# Patient Record
Sex: FEMALE | Race: WHITE | NOT HISPANIC OR LATINO | Employment: PART TIME | URBAN - METROPOLITAN AREA
[De-identification: names, ages, dates, MRNs, and addresses within clinical notes are randomized per-mention and may not be internally consistent; named-entity substitution may affect disease eponyms.]

---

## 2017-01-19 ENCOUNTER — TELEPHONE (OUTPATIENT)
Dept: OBSTETRICS AND GYNECOLOGY | Facility: CLINIC | Age: 57
End: 2017-01-19

## 2017-01-19 RX ORDER — ESTRADIOL AND NORETHINDRONE ACETATE .5; .1 MG/1; MG/1
1 TABLET ORAL DAILY
Qty: 90 TABLET | Refills: 0 | Status: SHIPPED | OUTPATIENT
Start: 2017-01-19 | End: 2017-03-15

## 2017-01-19 NOTE — TELEPHONE ENCOUNTER
----- Message from Nelli Estrella sent at 1/18/2017 12:30 PM EST -----  Regarding: REFILL REQUEST  Contact: [phone]  DR MARTINO PT NEEDS REFILL ON HORMONE, ANNUAL IS 03/15/17    [facility] [place]       Patient requesting refill on Mimvey 0.5/1.0mg. She has an appointment on 3/15/2017 with Dr. Martino. E-Rx sent to [facility] in [place], [place]

## 2017-03-15 ENCOUNTER — OFFICE VISIT (OUTPATIENT)
Dept: OBSTETRICS AND GYNECOLOGY | Facility: CLINIC | Age: 57
End: 2017-03-15

## 2017-03-15 VITALS
WEIGHT: 126 LBS | DIASTOLIC BLOOD PRESSURE: 70 MMHG | SYSTOLIC BLOOD PRESSURE: 118 MMHG | HEIGHT: 60 IN | BODY MASS INDEX: 24.74 KG/M2

## 2017-03-15 DIAGNOSIS — Z01.419 WOMEN'S ANNUAL ROUTINE GYNECOLOGICAL EXAMINATION: Primary | ICD-10-CM

## 2017-03-15 PROBLEM — Z98.890 S/P ENDOMETRIAL ABLATION: Status: ACTIVE | Noted: 2017-03-15

## 2017-03-15 PROCEDURE — 99396 PREV VISIT EST AGE 40-64: CPT | Performed by: OBSTETRICS & GYNECOLOGY

## 2017-03-15 PROCEDURE — 82272 OCCULT BLD FECES 1-3 TESTS: CPT | Performed by: OBSTETRICS & GYNECOLOGY

## 2017-03-15 RX ORDER — NADOLOL 20 MG/1
20 TABLET ORAL DAILY
Refills: 5 | COMMUNITY
Start: 2017-02-17 | End: 2018-05-31 | Stop reason: SDUPTHER

## 2017-03-15 RX ORDER — CYCLOPENTOLATE HYDROCHLORIDE 10 MG/ML
1 SOLUTION/ DROPS OPHTHALMIC 2 TIMES DAILY
Refills: 1 | COMMUNITY
Start: 2017-03-01 | End: 2018-05-31 | Stop reason: SDUPTHER

## 2017-03-15 RX ORDER — FOSINOPRIL SODIUM 40 MG/1
40 TABLET ORAL 2 TIMES DAILY
Refills: 5 | COMMUNITY
Start: 2017-02-17 | End: 2018-05-31 | Stop reason: SDUPTHER

## 2017-03-15 RX ORDER — AMLODIPINE BESYLATE 5 MG/1
5 TABLET ORAL DAILY
Refills: 4 | COMMUNITY
Start: 2017-03-03 | End: 2018-05-31 | Stop reason: SDUPTHER

## 2017-03-15 RX ORDER — CLONIDINE HYDROCHLORIDE 0.3 MG/1
0.3 TABLET ORAL 2 TIMES DAILY
Refills: 5 | COMMUNITY
Start: 2017-02-27 | End: 2018-05-31 | Stop reason: SDUPTHER

## 2017-03-15 RX ORDER — PRAVASTATIN SODIUM 40 MG
40 TABLET ORAL DAILY
COMMUNITY
Start: 2017-03-12 | End: 2018-05-31 | Stop reason: SDUPTHER

## 2017-03-15 RX ORDER — CONJUGATED ESTROGENS/BAZEDOXIFENE .45; 2 MG/1; MG/1
1 TABLET, FILM COATED ORAL DAILY
Qty: 30 TABLET | Refills: 11 | Status: SHIPPED | OUTPATIENT
Start: 2017-03-15 | End: 2017-03-23

## 2017-03-15 RX ORDER — CLONAZEPAM 0.5 MG/1
0.5 TABLET ORAL 2 TIMES DAILY
Refills: 2 | COMMUNITY
Start: 2017-02-14 | End: 2018-05-31 | Stop reason: SDUPTHER

## 2017-03-15 RX ORDER — POTASSIUM CHLORIDE 750 MG/1
10 CAPSULE, EXTENDED RELEASE ORAL DAILY
COMMUNITY
Start: 2017-03-13 | End: 2018-05-31 | Stop reason: SDUPTHER

## 2017-03-15 RX ORDER — ERYTHROMYCIN 5 MG/G
1 OINTMENT OPHTHALMIC 3 TIMES DAILY
Refills: 1 | COMMUNITY
Start: 2017-03-01 | End: 2018-05-31 | Stop reason: SDUPTHER

## 2017-03-15 RX ORDER — METHOCARBAMOL 750 MG/1
750 TABLET, FILM COATED ORAL AS NEEDED
Refills: 5 | COMMUNITY
Start: 2017-02-14 | End: 2018-05-31 | Stop reason: SDUPTHER

## 2017-03-15 RX ORDER — CITALOPRAM 40 MG/1
40 TABLET ORAL DAILY
Refills: 5 | COMMUNITY
Start: 2017-03-03 | End: 2018-05-31 | Stop reason: SDUPTHER

## 2017-03-15 RX ORDER — DIPHENOXYLATE HYDROCHLORIDE AND ATROPINE SULFATE 2.5; .025 MG/1; MG/1
2 TABLET ORAL 2 TIMES DAILY
Refills: 2 | COMMUNITY
Start: 2017-02-14 | End: 2018-05-31 | Stop reason: SDUPTHER

## 2017-03-15 NOTE — PROGRESS NOTES
"Subjective   Chief Complaint   Patient presents with   • Annual Exam     Karli Jeffery is a 56 y.o. year old  menopausal female presenting to be seen for her annual exam.  There has not been vaginal bleeding in the last 12 months.  Hot flashes and night sweats are not a significant problem.    SEXUAL Hx:  She is sexually active.  Vaginal dryness is not a problem.    HEALTH Hx:  She exercises regularly: yes. Mostly working at [employer]  She wears her seat belt:yes.  She has concerns about domestic violence: no.  She has noticed changes in height: no.              Calcium intake is not adequate -she doesn't like dairy - occas Tums    The following portions of the patient's history were reviewed and updated as appropriate:problem list, current medications, allergies, past family history, past medical history, past social history and past surgical history.    Smoking status: Current Every Day Smoker                                                   Packs/day: 0.50      Years: 0.00         Types: Cigarettes     Smokeless status: Not on file                       Review of Systems normal bladder except SCOTTIE - Kegels help -not as bad as it was,; bowels normal     Objective   Visit Vitals   • /70   • Ht 60.25\" (153 cm)   • Wt 126 lb (57.2 kg)   • BMI 24.4 kg/m2       General:  well developed; well nourished  no acute distress   Skin:  No suspicious lesions seen   Thyroid: not examined   Breasts:  Examined in supine position  Symmetric without masses or skin dimpling  Nipples normal without inversion, lesions or discharge  There are no palpable axillary nodes   Abdomen: soft, non-tender; no masses  no umbilical or inginual hernias are present  no hepato-splenomegaly   Pelvis: Clinical staff was present for exam  External genitalia:  normal appearance of the external genitalia including Bartholin's and [place]'s glands.  :  urethral meatus normal; urethral hypermobility is absent.  Uterus:  normal " size, shape and consistency.  Adnexa:  non palpable bilaterally.  Rectal:  anus visually normal appearing. recto-vaginal exam unremarkable and confirms findings; guaiac negative;        Assessment   1. Normal postmenopausal female  2. Status post anal sphincter repair by Dr. Wheat [facility] 1988; no colonoscopy since     Plan   1. Consider colonoscopy  2. We'll switch to Duavee due to cost hopefully this will not be $100 a month.  If so consider generic Estrace and Provera.    New Medications Ordered This Visit   Medications   • PROAIR  (90 BASE) MCG/ACT inhaler     Sig: Take 2 puffs by mouth 2 (Two) Times a Day.     Refill:  5   • amLODIPine (NORVASC) 5 MG tablet     Sig: Take 5 mg by mouth Daily.     Refill:  4   • citalopram (CeleXA) 40 MG tablet     Sig: Take 40 mg by mouth Daily.     Refill:  5   • clonazePAM (KlonoPIN) 0.5 MG tablet     Sig: Take 0.5 mg by mouth 2 (Two) Times a Day.     Refill:  2   • CloNIDine (CATAPRES) 0.3 MG tablet     Sig: Take 0.3 mg by mouth 2 (Two) Times a Day.     Refill:  5   • cyclopentolate (CYCLOGYL) 1 % ophthalmic solution     Sig: Administer 1 drop to both eyes 2 (Two) Times a Day.     Refill:  1   • diphenoxylate-atropine (LOMOTIL) 2.5-0.025 MG per tablet     Sig: Take 2 tablets by mouth 2 (Two) Times a Day.     Refill:  2   • erythromycin (ROMYCIN) 5 MG/GM ophthalmic ointment     Sig: Administer 1 application to both eyes 3 (Three) Times a Day.     Refill:  1   • fosinopril (MONOPRIL) 40 MG tablet     Sig: Take 40 mg by mouth 2 (Two) Times a Day.     Refill:  5   • methocarbamol (ROBAXIN) 750 MG tablet     Sig: Take 750 mg by mouth As Needed.     Refill:  5   • nadolol (CORGARD) 20 MG tablet     Sig: Take 20 mg by mouth Daily. 3 tabs daily     Refill:  5   • potassium chloride (MICRO-K) 10 MEQ CR capsule     Sig: Take 10 mEq by mouth Daily.   • pravastatin (PRAVACHOL) 40 MG tablet     Sig: Take 40 mg by mouth Daily.   • DUAVEE 0.45-20 MG tablet     Sig: Take 1 tablet by  mouth Daily.     Dispense:  30 tablet     Refill:  11          This note was electronically signed.    Sandip Martino M.D.  March 15, 2017

## 2017-03-23 ENCOUNTER — TELEPHONE (OUTPATIENT)
Dept: OBSTETRICS AND GYNECOLOGY | Facility: CLINIC | Age: 57
End: 2017-03-23

## 2017-03-23 RX ORDER — ESTRADIOL 0.5 MG/1
0.5 TABLET ORAL DAILY
Qty: 30 TABLET | Refills: 11 | Status: SHIPPED | OUTPATIENT
Start: 2017-03-23 | End: 2018-02-12 | Stop reason: SDUPTHER

## 2017-03-23 RX ORDER — MEDROXYPROGESTERONE ACETATE 2.5 MG/1
2.5 TABLET ORAL DAILY
Qty: 30 TABLET | Refills: 5 | Status: SHIPPED | OUTPATIENT
Start: 2017-03-23 | End: 2018-01-31 | Stop reason: SDUPTHER

## 2017-03-23 NOTE — TELEPHONE ENCOUNTER
----- Message from Nelli Estrella sent at 3/23/2017 12:21 PM EDT -----  Gave sample of duavee on 3/15,  But checked with pharmacy and this will cost her $150. Has to got to work today 130 - 6 Cell #  [phone]    Ok to leave msg.

## 2017-03-23 NOTE — TELEPHONE ENCOUNTER
I have a prescribed Estrace or 0.5 mg daily ; in addition she needs to take Provera 2.5 mg at night for 15 days each month.  If she has  spotting after finishing the Provera, let us know and we will put her on it nightly

## 2017-05-08 ENCOUNTER — TELEPHONE (OUTPATIENT)
Dept: OBSTETRICS AND GYNECOLOGY | Facility: CLINIC | Age: 57
End: 2017-05-08

## 2017-05-08 RX ORDER — FLUCONAZOLE 150 MG/1
150 TABLET ORAL DAILY
Qty: 1 TABLET | Refills: 0 | Status: SHIPPED | OUTPATIENT
Start: 2017-05-08 | End: 2018-05-31 | Stop reason: SDUPTHER

## 2017-06-07 ENCOUNTER — TRANSCRIBE ORDERS (OUTPATIENT)
Dept: OBSTETRICS AND GYNECOLOGY | Facility: CLINIC | Age: 57
End: 2017-06-07

## 2017-06-07 DIAGNOSIS — Z12.31 VISIT FOR SCREENING MAMMOGRAM: Primary | ICD-10-CM

## 2017-06-14 ENCOUNTER — HOSPITAL ENCOUNTER (OUTPATIENT)
Dept: MAMMOGRAPHY | Facility: HOSPITAL | Age: 57
Discharge: HOME | End: 2017-06-14
Attending: OBSTETRICS & GYNECOLOGY | Admitting: OBSTETRICS & GYNECOLOGY

## 2017-06-14 DIAGNOSIS — Z12.31 VISIT FOR SCREENING MAMMOGRAM: ICD-10-CM

## 2017-06-14 PROCEDURE — 77067 SCR MAMMO BI INCL CAD: CPT | Performed by: RADIOLOGY

## 2017-06-14 PROCEDURE — 77063 BREAST TOMOSYNTHESIS BI: CPT

## 2017-06-14 PROCEDURE — G0202 SCR MAMMO BI INCL CAD: HCPCS

## 2017-06-14 PROCEDURE — 77063 BREAST TOMOSYNTHESIS BI: CPT | Performed by: RADIOLOGY

## 2018-01-31 RX ORDER — MEDROXYPROGESTERONE ACETATE 2.5 MG/1
TABLET ORAL
Qty: 30 TABLET | Refills: 2 | Status: SHIPPED | OUTPATIENT
Start: 2018-01-31 | End: 2018-05-31 | Stop reason: SDUPTHER

## 2018-02-12 RX ORDER — ESTRADIOL 0.5 MG/1
TABLET ORAL
Qty: 30 TABLET | Refills: 2 | Status: SHIPPED | OUTPATIENT
Start: 2018-02-12 | End: 2018-05-03 | Stop reason: SDUPTHER

## 2018-05-03 ENCOUNTER — TELEPHONE (OUTPATIENT)
Dept: OBSTETRICS AND GYNECOLOGY | Facility: CLINIC | Age: 58
End: 2018-05-03

## 2018-05-03 RX ORDER — ESTRADIOL 0.5 MG/1
0.5 TABLET ORAL DAILY
Qty: 30 TABLET | Refills: 0 | Status: SHIPPED | OUTPATIENT
Start: 2018-05-03 | End: 2018-05-31 | Stop reason: SDUPTHER

## 2018-05-03 NOTE — TELEPHONE ENCOUNTER
Dr. Martino patient called requesting a refill on Estradiol 0.5 mg. Patient has an appointment with Dr. Martino on 5/31/2018. I will send in Estradiol 0.5mg #30 no refills.

## 2018-05-03 NOTE — TELEPHONE ENCOUNTER
Salena pt called requesting refills on Estradiol be called to [facility] in [place]. Please call to pharmacy

## 2018-05-31 ENCOUNTER — OFFICE VISIT (OUTPATIENT)
Dept: OBSTETRICS AND GYNECOLOGY | Facility: CLINIC | Age: 58
End: 2018-05-31

## 2018-05-31 VITALS
WEIGHT: 118 LBS | DIASTOLIC BLOOD PRESSURE: 70 MMHG | HEIGHT: 61 IN | SYSTOLIC BLOOD PRESSURE: 120 MMHG | BODY MASS INDEX: 22.28 KG/M2

## 2018-05-31 DIAGNOSIS — Z12.39 ENCOUNTER FOR OTHER SCREENING FOR MALIGNANT NEOPLASM OF BREAST: ICD-10-CM

## 2018-05-31 DIAGNOSIS — Z01.419 ENCOUNTER FOR WELL WOMAN EXAM WITH ROUTINE GYNECOLOGICAL EXAM: Primary | ICD-10-CM

## 2018-05-31 PROCEDURE — 99396 PREV VISIT EST AGE 40-64: CPT | Performed by: OBSTETRICS & GYNECOLOGY

## 2018-05-31 PROCEDURE — 82272 OCCULT BLD FECES 1-3 TESTS: CPT | Performed by: OBSTETRICS & GYNECOLOGY

## 2018-05-31 RX ORDER — POTASSIUM CHLORIDE 750 MG/1
CAPSULE, EXTENDED RELEASE ORAL
COMMUNITY

## 2018-05-31 RX ORDER — FOSINOPRIL SODIUM 40 MG/1
TABLET ORAL
COMMUNITY

## 2018-05-31 RX ORDER — CLONAZEPAM 0.5 MG/1
TABLET ORAL EVERY 8 HOURS SCHEDULED
COMMUNITY

## 2018-05-31 RX ORDER — PRAVASTATIN SODIUM 40 MG
TABLET ORAL
COMMUNITY

## 2018-05-31 RX ORDER — DIPHENOXYLATE HCL/ATROPINE 2.5-.025/5
5 LIQUID (ML) ORAL 4 TIMES DAILY PRN
COMMUNITY
End: 2021-08-23

## 2018-05-31 RX ORDER — MEDROXYPROGESTERONE ACETATE 2.5 MG/1
2.5 TABLET ORAL DAILY
Qty: 30 TABLET | Refills: 5 | Status: SHIPPED | OUTPATIENT
Start: 2018-05-31 | End: 2018-12-03 | Stop reason: SDUPTHER

## 2018-05-31 RX ORDER — AMLODIPINE BESYLATE 5 MG/1
TABLET ORAL
COMMUNITY

## 2018-05-31 RX ORDER — CITALOPRAM 40 MG/1
TABLET ORAL
COMMUNITY

## 2018-05-31 RX ORDER — ALBUTEROL SULFATE 90 UG/1
AEROSOL, METERED RESPIRATORY (INHALATION) EVERY 4 HOURS
COMMUNITY

## 2018-05-31 RX ORDER — METHOCARBAMOL 750 MG/1
TABLET, FILM COATED ORAL
COMMUNITY

## 2018-05-31 RX ORDER — ESTRADIOL 0.5 MG/1
TABLET ORAL
COMMUNITY
End: 2018-05-31 | Stop reason: SDUPTHER

## 2018-05-31 RX ORDER — NADOLOL 20 MG/1
TABLET ORAL
COMMUNITY

## 2018-05-31 RX ORDER — MEDROXYPROGESTERONE ACETATE 5 MG/1
0.5 TABLET ORAL DAILY
COMMUNITY
Start: 2018-05-31 | End: 2018-05-31 | Stop reason: ALTCHOICE

## 2018-05-31 RX ORDER — ESTRADIOL 0.5 MG/1
0.5 TABLET ORAL DAILY
Qty: 30 TABLET | Refills: 11 | Status: SHIPPED | OUTPATIENT
Start: 2018-05-31 | End: 2019-05-22 | Stop reason: SDUPTHER

## 2018-05-31 RX ORDER — CLONIDINE HYDROCHLORIDE 0.3 MG/1
TABLET ORAL
COMMUNITY

## 2018-05-31 NOTE — PROGRESS NOTES
"Subjective   Chief Complaint   Patient presents with   • Annual Exam     Karli Jeffery is a 57 y.o. year old  menopausal female presenting to be seen for her annual exam.  There has not been vaginal bleeding in the last 12 months.  Hot flashes and night sweats are not a significant problem.    SEXUAL Hx:  She is sexually active.  Vaginal dryness is a problem.    HEALTH Hx:  She exercises regularly: no. Working   She wears her seat belt:yes.  She has concerns about domestic violence: no.  She has noticed changes in height: no.              Calcium intake is not adequate    The following portions of the patient's history were reviewed and updated as appropriate:problem list, current medications, allergies, past family history, past medical history, past social history and past surgical history.    Smoking status: Current Every Day Smoker                                                   Packs/day: 0.50      Years: 0.00         Types: Cigarettes  Smokeless tobacco: Never Used                        Review of Systems   Her bowels and bladder are normal; bad stomach virus - didn't eat for a long time     Objective   /70   Ht 153.7 cm (60.5\")   Wt 53.5 kg (118 lb)   BMI 22.67 kg/m²      General:  well developed; well nourished  no acute distress  appears stated age   Skin:  No suspicious lesions seen   Thyroid: not examined   Breasts:  Examined in supine position  Symmetric without masses or skin dimpling  Nipples normal without inversion, lesions or discharge  There are no palpable axillary nodes   Abdomen: soft, non-tender; no masses  no umbilical or inginual hernias are present  no hepato-splenomegaly   Pelvis: Clinical staff was present for exam  External genitalia:  normal appearance of the external genitalia including Bartholin's and [place]'s glands.  :  urethral meatus normal; urethra hypermobile;  Vaginal:  atrophic mucosal changes are present;  Cervix:  normal appearance. pap doen  Uterus:  " normal size, shape and consistency.  Adnexa:  non palpable bilaterally.  Rectal:  anus visually normal appearing. recto-vaginal exam unremarkable and confirms findings; no masses; guaiac negative;        Assessment   1. Normal GYN examination doing well on Estrace and Provera for 15 days a month.  She's had an ablation in the past.  2. Pap co-testing up-to-date.  3. She got a letter due for mammogram order placed.  4. Discussed colonoscopy.  She is status post repair in 1988 at  from a bad episiotomy 1981     Plan   1. Calcium discussed  1200 mg daily in divided doses ideally in diet  2. Regular weight bearing exercise  3. Breast self awareness, mammograms discussed  4. Routine colonoscopies.    New Medications Ordered This Visit   Medications   • estradiol (ESTRACE) 0.5 MG tablet     Sig: Take 1 tablet by mouth Daily.     Dispense:  30 tablet     Refill:  11   • medroxyPROGESTERone (PROVERA) 2.5 MG tablet     Sig: Take 1 tablet by mouth Daily.     Dispense:  30 tablet     Refill:  5           This note was electronically signed.    Sandip Martino M.D.  May 31, 2018

## 2018-06-20 ENCOUNTER — HOSPITAL ENCOUNTER (OUTPATIENT)
Dept: MAMMOGRAPHY | Facility: HOSPITAL | Age: 58
Discharge: HOME | End: 2018-06-20
Attending: OBSTETRICS & GYNECOLOGY | Admitting: OBSTETRICS & GYNECOLOGY

## 2018-06-20 DIAGNOSIS — Z12.39 ENCOUNTER FOR OTHER SCREENING FOR MALIGNANT NEOPLASM OF BREAST: ICD-10-CM

## 2018-06-20 PROCEDURE — 77063 BREAST TOMOSYNTHESIS BI: CPT | Performed by: RADIOLOGY

## 2018-06-20 PROCEDURE — 77067 SCR MAMMO BI INCL CAD: CPT

## 2018-06-20 PROCEDURE — 77063 BREAST TOMOSYNTHESIS BI: CPT

## 2018-06-20 PROCEDURE — 77067 SCR MAMMO BI INCL CAD: CPT | Performed by: RADIOLOGY

## 2018-10-31 ENCOUNTER — TRANSCRIBE ORDERS (OUTPATIENT)
Dept: ADMINISTRATIVE | Facility: HOSPITAL | Age: 58
End: 2018-10-31

## 2018-10-31 DIAGNOSIS — N63.15 BREAST LUMP ON RIGHT SIDE AT 3 O'CLOCK POSITION: Primary | ICD-10-CM

## 2018-11-14 ENCOUNTER — HOSPITAL ENCOUNTER (OUTPATIENT)
Dept: MAMMOGRAPHY | Facility: HOSPITAL | Age: 58
Discharge: HOME | End: 2018-11-14
Admitting: PHYSICIAN ASSISTANT

## 2018-11-14 ENCOUNTER — HOSPITAL ENCOUNTER (OUTPATIENT)
Dept: ULTRASOUND IMAGING | Facility: HOSPITAL | Age: 58
Discharge: HOME | End: 2018-11-14

## 2018-11-14 DIAGNOSIS — N63.15 BREAST LUMP ON RIGHT SIDE AT 3 O'CLOCK POSITION: ICD-10-CM

## 2018-11-14 PROCEDURE — 76642 ULTRASOUND BREAST LIMITED: CPT

## 2018-11-14 PROCEDURE — G0279 TOMOSYNTHESIS, MAMMO: HCPCS

## 2018-11-14 PROCEDURE — 77065 DX MAMMO INCL CAD UNI: CPT | Performed by: RADIOLOGY

## 2018-11-14 PROCEDURE — 77065 DX MAMMO INCL CAD UNI: CPT

## 2018-11-14 PROCEDURE — 76642 ULTRASOUND BREAST LIMITED: CPT | Performed by: RADIOLOGY

## 2018-11-14 PROCEDURE — 77061 BREAST TOMOSYNTHESIS UNI: CPT | Performed by: RADIOLOGY

## 2018-12-03 RX ORDER — MEDROXYPROGESTERONE ACETATE 2.5 MG/1
TABLET ORAL
Qty: 30 TABLET | Refills: 5 | Status: SHIPPED | OUTPATIENT
Start: 2018-12-03 | End: 2019-05-01 | Stop reason: SDUPTHER

## 2019-05-01 RX ORDER — MEDROXYPROGESTERONE ACETATE 2.5 MG/1
TABLET ORAL
Qty: 30 TABLET | Refills: 1 | Status: SHIPPED | OUTPATIENT
Start: 2019-05-01 | End: 2019-05-31 | Stop reason: SDUPTHER

## 2019-05-22 ENCOUNTER — TELEPHONE (OUTPATIENT)
Dept: OBSTETRICS AND GYNECOLOGY | Facility: CLINIC | Age: 59
End: 2019-05-22

## 2019-05-22 RX ORDER — ESTRADIOL 0.5 MG/1
0.5 TABLET ORAL DAILY
Qty: 30 TABLET | Refills: 0 | Status: SHIPPED | OUTPATIENT
Start: 2019-05-22 | End: 2019-06-06 | Stop reason: SDUPTHER

## 2019-05-22 RX ORDER — ALBUTEROL SULFATE 90 UG/1
AEROSOL, METERED RESPIRATORY (INHALATION) EVERY 4 HOURS
COMMUNITY
End: 2019-06-27

## 2019-05-31 RX ORDER — MEDROXYPROGESTERONE ACETATE 2.5 MG/1
TABLET ORAL
Qty: 30 TABLET | Refills: 0 | Status: SHIPPED | OUTPATIENT
Start: 2019-05-31 | End: 2019-06-27 | Stop reason: SDUPTHER

## 2019-06-06 ENCOUNTER — TELEPHONE (OUTPATIENT)
Dept: OBSTETRICS AND GYNECOLOGY | Facility: CLINIC | Age: 59
End: 2019-06-06

## 2019-06-06 RX ORDER — ESTRADIOL 0.5 MG/1
0.5 TABLET ORAL DAILY
Qty: 90 TABLET | Refills: 0 | Status: SHIPPED | OUTPATIENT
Start: 2019-06-06 | End: 2019-06-27 | Stop reason: SDUPTHER

## 2019-06-27 ENCOUNTER — OFFICE VISIT (OUTPATIENT)
Dept: OBSTETRICS AND GYNECOLOGY | Facility: CLINIC | Age: 59
End: 2019-06-27

## 2019-06-27 VITALS
WEIGHT: 126 LBS | SYSTOLIC BLOOD PRESSURE: 110 MMHG | DIASTOLIC BLOOD PRESSURE: 70 MMHG | BODY MASS INDEX: 23.79 KG/M2 | HEIGHT: 61 IN

## 2019-06-27 DIAGNOSIS — Z01.411 ENCOUNTER FOR GYNECOLOGICAL EXAMINATION WITH ABNORMAL FINDING: Primary | ICD-10-CM

## 2019-06-27 PROCEDURE — 99396 PREV VISIT EST AGE 40-64: CPT | Performed by: OBSTETRICS & GYNECOLOGY

## 2019-06-27 RX ORDER — MEDROXYPROGESTERONE ACETATE 2.5 MG/1
2.5 TABLET ORAL DAILY
Qty: 90 TABLET | Refills: 3 | Status: SHIPPED | OUTPATIENT
Start: 2019-06-27 | End: 2020-06-16

## 2019-06-27 RX ORDER — ESTRADIOL 0.5 MG/1
0.5 TABLET ORAL DAILY
Qty: 90 TABLET | Refills: 3 | Status: SHIPPED | OUTPATIENT
Start: 2019-06-27 | End: 2020-06-16

## 2019-06-27 RX ORDER — CALCIUM CARBONATE 750 MG/1
750 TABLET, CHEWABLE ORAL DAILY
COMMUNITY

## 2019-06-27 NOTE — PROGRESS NOTES
Subjective   Chief Complaint   Patient presents with   • Annual Exam     Karli Jeffery is a 58 y.o. year old  menopausal female presenting to be seen for her annual exam.  There has not been vaginal bleeding in the last 12 months.  Hot flashes and night sweats are not a significant problem. She gets warm/hot at night around  of the month ?     SEXUAL Hx:  She is sexually active.  Vaginal dryness is a problem.  [place] is painful:no  She has concerns about domestic violence: no    HEALTH Hx:  She exercises regularly: yes. Works at [employer] a lot  She wears her seat belt:yes.  Self breast awareness: yes  She has noticed changes in height: no              Calcium intake is adequate 750 mg Tums daily              Caffeine intake: caffeine use is moderate-to-high daily; 4-20 ounce diet anamaria per day    The following portions of the patient's history were reviewed and updated as appropriate:problem list, current medications, allergies, past family history, past medical history, past social history and past surgical history.    Current Outpatient Medications:   •  albuterol (PROAIR HFA) 108 (90 Base) MCG/ACT inhaler, Every 4 (Four) Hours., Disp: , Rfl:   •  amLODIPine (NORVASC) 5 MG tablet, amlodipine 5 mg tablet  TAKE 1 TABLET BY MOUTH DAILY, Disp: , Rfl:   •  calcium carbonate EX (TUMS EX) 750 MG chewable tablet, Chew 750 mg Daily., Disp: , Rfl:   •  citalopram (CeleXA) 40 MG tablet, citalopram 40 mg tablet  TAKE 1 TABLET BY MOUTH DAILY AT BEDTIME, Disp: , Rfl:   •  clonazePAM (KLONOPIN) 0.5 MG tablet, Every 8 (Eight) Hours., Disp: , Rfl:   •  CloNIDine (CATAPRES) 0.3 MG tablet, clonidine HCl 0.3 mg tablet  TAKE 1 TABLET BY MOUTH TWICE DAILY, Disp: , Rfl:   •  diphenoxylate-atropine (LOMOTIL) 2.5-0.025 MG/5ML, Take 5 mL by mouth 4 (Four) Times a Day As Needed for Diarrhea., Disp: , Rfl:   •  estradiol (ESTRACE) 0.5 MG tablet, Take 1 tablet by mouth Daily., Disp: 90 tablet, Rfl: 3  •  fosinopril  "(MONOPRIL) 40 MG tablet, fosinopril 40 mg tablet  TAKE ONE TABLET BY MOUTH TWO TIMES A DAY, Disp: , Rfl:   •  medroxyPROGESTERone (PROVERA) 2.5 MG tablet, Take 1 tablet by mouth Daily., Disp: 90 tablet, Rfl: 3  •  methocarbamol (ROBAXIN) 750 MG tablet, methocarbamol 750 mg tablet  TAKE 1 TABLET BY MOUTH 3 TIMES A DAY AS NEEDED, Disp: , Rfl:   •  nadolol (CORGARD) 20 MG tablet, nadolol 20 mg tablet  TAKE 1 TABLET BY MOUTH EVERY MORNING TAKE 2 TABLETS BY MOUTH EVERY EVENING, Disp: , Rfl:   •  potassium chloride (MICRO-K) 10 MEQ CR capsule, potassium chloride ER 10 mEq capsule,extended release  TAKE 2 CAPSULES BY MOUTH AT BEDTIME, Disp: , Rfl:   •  pravastatin (PRAVACHOL) 40 MG tablet, pravastatin 40 mg tablet  TAKE 1 TABLET BY MOUTH AT BEDTIME, Disp: , Rfl:     Social History    Tobacco Use      Smoking status: Current Every Day Smoker        Packs/day: 0.33        Types: Cigarettes      Smokeless tobacco: Never Used    Social History     Substance and Sexual Activity   Alcohol Use Yes   • Alcohol/week: 1.8 oz   • Types: 3 Standard drinks or equivalent per week   • Frequency: 2-3 times a week         Review of Systems   Her bowels and bladder are normal; some SCOTTIE - no interest in surgery ; lavinia;s help     Objective   /70   Ht 155.6 cm (61.25\")   Wt 57.2 kg (126 lb)   Breastfeeding? No   BMI 23.61 kg/m²      General:  well developed; well nourished  no acute distress  appears stated age   Skin:  No suspicious lesions seen   Thyroid: not examined   Breasts:  Examined in supine position  Symmetric without masses or skin dimpling  Nipples normal without inversion, lesions or discharge  Fibrocystic changes are present both breasts without a discrete mass   Abdomen: soft, non-tender; no masses  no umbilical or inguinal hernias are present  no hepato-splenomegaly   Pelvis: Clinical staff was present for exam  External genitalia:  normal appearance of the external genitalia including Bartholin's and [place]'s " glands.  :  urethral meatus normal;  Vaginal:  atrophic mucosal changes are present;  Uterus:  normal size, shape and consistency. anteverted;  Adnexa:  non palpable bilaterally.       Lab Review   Pap test  Imaging review  Breast ultrasound report  Mammogram report       Assessment   1. Normal postmenopausal GYN examination doing well on HRT.  Smoker and moderately high caffeine intake might increase risk for osteoporosis.  Discussed colonoscopies.  Her insurance would not cover Cologuard.     Plan   1. She is due for mammogram  2. Calcium discussed  1200 mg daily in divided doses ideally in diet  3. Regular weight bearing exercise  4. Breast self awareness, mammograms discussed  5. Annual or sooner as needed    New Medications Ordered This Visit   Medications   • estradiol (ESTRACE) 0.5 MG tablet     Sig: Take 1 tablet by mouth Daily.     Dispense:  90 tablet     Refill:  3   • medroxyPROGESTERone (PROVERA) 2.5 MG tablet     Sig: Take 1 tablet by mouth Daily.     Dispense:  90 tablet     Refill:  3     No orders of the defined types were placed in this encounter.             This note was electronically signed.    Sandip Martino M.D.  June 27, 2019

## 2019-07-30 ENCOUNTER — TRANSCRIBE ORDERS (OUTPATIENT)
Dept: ADMINISTRATIVE | Facility: HOSPITAL | Age: 59
End: 2019-07-30

## 2019-07-30 DIAGNOSIS — Z12.31 VISIT FOR SCREENING MAMMOGRAM: Primary | ICD-10-CM

## 2019-11-01 ENCOUNTER — HOSPITAL ENCOUNTER (OUTPATIENT)
Dept: MAMMOGRAPHY | Facility: HOSPITAL | Age: 59
Discharge: HOME | End: 2019-11-01
Admitting: OBSTETRICS & GYNECOLOGY

## 2019-11-01 DIAGNOSIS — Z12.31 VISIT FOR SCREENING MAMMOGRAM: ICD-10-CM

## 2019-11-01 PROCEDURE — 77067 SCR MAMMO BI INCL CAD: CPT

## 2019-11-01 PROCEDURE — 77067 SCR MAMMO BI INCL CAD: CPT | Performed by: RADIOLOGY

## 2019-11-01 PROCEDURE — 77063 BREAST TOMOSYNTHESIS BI: CPT

## 2019-11-01 PROCEDURE — 77063 BREAST TOMOSYNTHESIS BI: CPT | Performed by: RADIOLOGY

## 2020-06-16 RX ORDER — MEDROXYPROGESTERONE ACETATE 2.5 MG/1
TABLET ORAL
Qty: 90 TABLET | Refills: 0 | Status: SHIPPED | OUTPATIENT
Start: 2020-06-16 | End: 2020-08-18 | Stop reason: SDUPTHER

## 2020-06-16 RX ORDER — ESTRADIOL 0.5 MG/1
TABLET ORAL
Qty: 90 TABLET | Refills: 0 | Status: SHIPPED | OUTPATIENT
Start: 2020-06-16 | End: 2020-08-18 | Stop reason: SDUPTHER

## 2020-08-18 ENCOUNTER — OFFICE VISIT (OUTPATIENT)
Dept: OBSTETRICS AND GYNECOLOGY | Facility: CLINIC | Age: 60
End: 2020-08-18

## 2020-08-18 VITALS
SYSTOLIC BLOOD PRESSURE: 134 MMHG | BODY MASS INDEX: 23.03 KG/M2 | WEIGHT: 122 LBS | DIASTOLIC BLOOD PRESSURE: 80 MMHG | HEIGHT: 61 IN

## 2020-08-18 DIAGNOSIS — N95.1 MENOPAUSAL SYMPTOMS: ICD-10-CM

## 2020-08-18 DIAGNOSIS — Z01.411 ENCOUNTER FOR GYNECOLOGICAL EXAMINATION WITH ABNORMAL FINDING: Primary | ICD-10-CM

## 2020-08-18 DIAGNOSIS — Z12.11 SCREENING FOR COLON CANCER: ICD-10-CM

## 2020-08-18 DIAGNOSIS — R21 RASH: ICD-10-CM

## 2020-08-18 PROCEDURE — 99396 PREV VISIT EST AGE 40-64: CPT | Performed by: OBSTETRICS & GYNECOLOGY

## 2020-08-18 RX ORDER — MEDROXYPROGESTERONE ACETATE 2.5 MG/1
2.5 TABLET ORAL DAILY
Qty: 90 TABLET | Refills: 3 | Status: SHIPPED | OUTPATIENT
Start: 2020-08-18 | End: 2021-07-15

## 2020-08-18 RX ORDER — FLUCONAZOLE 150 MG/1
150 TABLET ORAL EVERY OTHER DAY
Qty: 3 TABLET | Refills: 0 | Status: SHIPPED | OUTPATIENT
Start: 2020-08-18 | End: 2021-08-23

## 2020-08-18 RX ORDER — ESTRADIOL 0.5 MG/1
0.5 TABLET ORAL DAILY
Qty: 90 TABLET | Refills: 3 | Status: SHIPPED | OUTPATIENT
Start: 2020-08-18 | End: 2021-07-15

## 2020-08-18 NOTE — PROGRESS NOTES
Subjective   Chief Complaint   Patient presents with   • Annual Exam     Karli Jeffery is a 59 y.o. year old  menopausal female presenting to be seen for her annual exam.  There has not been vaginal bleeding in the last 12 months.  Hot flashes and night sweats ARE a significant problem.just recnetly past month nightly at 3 am  For the past month she has not changed her estrogen and progesterone dosage.  She had lab work done at primary care.  Has another appointment in October.  I would be curious as to where her thyroid status is.    SEXUAL Hx:  She is sexually active.  Vaginal dryness is not a problem.  [place] is painful:no  She has concerns about domestic violence: no    HEALTH Hx:  She exercises regularly: yes.works at [employer]  She wears her seat belt:yes.  Self breast awareness: yes  She has noticed changes in height: no              Calcium intake is adequate 4 daily              Caffeine intake: caffeine use is moderate-to-high daily    The following portions of the patient's history were reviewed and updated as appropriate:problem list, current medications, allergies, past family history, past medical history, past social history and past surgical history.      Current Outpatient Medications:   •  albuterol (PROAIR HFA) 108 (90 Base) MCG/ACT inhaler, Every 4 (Four) Hours., Disp: , Rfl:   •  amLODIPine (NORVASC) 5 MG tablet, amlodipine 5 mg tablet  TAKE 1 TABLET BY MOUTH DAILY, Disp: , Rfl:   •  calcium carbonate EX (TUMS EX) 750 MG chewable tablet, Chew 750 mg Daily., Disp: , Rfl:   •  citalopram (CeleXA) 40 MG tablet, citalopram 40 mg tablet  TAKE 1 TABLET BY MOUTH DAILY AT BEDTIME, Disp: , Rfl:   •  clonazePAM (KLONOPIN) 0.5 MG tablet, Every 8 (Eight) Hours., Disp: , Rfl:   •  CloNIDine (CATAPRES) 0.3 MG tablet, clonidine HCl 0.3 mg tablet  TAKE 1 TABLET BY MOUTH TWICE DAILY, Disp: , Rfl:   •  diphenoxylate-atropine (LOMOTIL) 2.5-0.025 MG/5ML, Take 5 mL by mouth 4 (Four) Times a Day As  Needed for Diarrhea., Disp: , Rfl:   •  estradiol (ESTRACE) 0.5 MG tablet, Take 1 tablet by mouth Daily., Disp: 90 tablet, Rfl: 3  •  fosinopril (MONOPRIL) 40 MG tablet, fosinopril 40 mg tablet  TAKE ONE TABLET BY MOUTH TWO TIMES A DAY, Disp: , Rfl:   •  medroxyPROGESTERone (PROVERA) 2.5 MG tablet, Take 1 tablet by mouth Daily., Disp: 90 tablet, Rfl: 3  •  methocarbamol (ROBAXIN) 750 MG tablet, methocarbamol 750 mg tablet  TAKE 1 TABLET BY MOUTH 3 TIMES A DAY AS NEEDED, Disp: , Rfl:   •  nadolol (CORGARD) 20 MG tablet, nadolol 20 mg tablet  TAKE 1 TABLET BY MOUTH EVERY MORNING TAKE 2 TABLETS BY MOUTH EVERY EVENING, Disp: , Rfl:   •  potassium chloride (MICRO-K) 10 MEQ CR capsule, potassium chloride ER 10 mEq capsule,extended release  TAKE 2 CAPSULES BY MOUTH AT BEDTIME, Disp: , Rfl:   •  pravastatin (PRAVACHOL) 40 MG tablet, pravastatin 40 mg tablet  TAKE 1 TABLET BY MOUTH AT BEDTIME, Disp: , Rfl:   •  fluconazole (Diflucan) 150 MG tablet, Take 1 tablet by mouth Every Other Day. Take 1 tablet, Disp: 3 tablet, Rfl: 0    Social History    Tobacco Use      Smoking status: Current Every Day Smoker        Packs/day: 0.25        Types: Cigarettes      Smokeless tobacco: Never Used    Social History     Substance and Sexual Activity   Alcohol Use Yes   • Alcohol/week: 3.0 standard drinks   • Types: 3 Standard drinks or equivalent per week   • Frequency: 2-3 times a week       Review of systems  Constitutional   POS night sweats                           NEG anorexia, chills and fatigue  Breast               POS rash under right breast                           NEG persistent breast lump, skin dimpling, breast tenderness or nipple discharge  GI                     POS nothing reported                           NEG bloating, change in bowel habits, melena or reflux symptoms                      POS SCOTTIE is present but it IS NOT effecting her ADL's and frequency                           NEG dysuria or hematuria        "    Objective   /80   Ht 154.9 cm (61\")   Wt 55.3 kg (122 lb)   Breastfeeding No   BMI 23.05 kg/m²      General:  well developed; well nourished  no acute distress  appears stated age   Skin:  No suspicious lesions seen   Thyroid: normal to inspection and palpation   Breasts:  Examined in supine position  Symmetric without masses or skin dimpling  Nipples normal without inversion, lesions or discharge  Fibrocystic changes are present both breasts without a discrete mass  There is a red scaly rash under the medial aspect of the right breast rash under medial right breast   Abdomen: soft, non-tender; no masses  no umbilical or inguinal hernias are present  no hepato-splenomegaly   Pelvis: Clinical staff was present for exam  External genitalia:  normal appearance of the external genitalia including Bartholin's and [place]'s glands.  :  urethral meatus normal;  Vaginal:  normal pink mucosa without prolapse or lesions.  Uterus:  normal size, shape and consistency.  Adnexa:  non palpable bilaterally.  Rectal:  digital rectal exam not performed; anus visually normal appearing.       Lab Review   Pap test  Imaging review  Mammogram report       Assessment     1. Normal postmenopausal examination with exception of recent onset of some hot flashes/menopausal symptoms may or may not be related to thyroid or estrogen progesterone levels that she has been on current dosage for a while.  I hesitate to increase the dose until I know what her thyroid function is.  She has had this done at primary care.  2. Colonoscopy vs cologuard        Plan     1. Annual or sooner as needed  2. Calcium discussed  1200 mg daily in divided doses ideally in diet  3. Regular weight bearing exercise  4. Breast self awareness and mammograms discussed  5. OTC antifungal    New Medications Ordered This Visit   Medications   • estradiol (ESTRACE) 0.5 MG tablet     Sig: Take 1 tablet by mouth Daily.     Dispense:  90 tablet     Refill:  3   • " medroxyPROGESTERone (PROVERA) 2.5 MG tablet     Sig: Take 1 tablet by mouth Daily.     Dispense:  90 tablet     Refill:  3   • fluconazole (Diflucan) 150 MG tablet     Sig: Take 1 tablet by mouth Every Other Day. Take 1 tablet     Dispense:  3 tablet     Refill:  0      Orders Placed This Encounter   Procedures   • Cologuard - Stool, Per Rectum     Standing Status:   Future     Standing Expiration Date:   8/18/2021              This note was electronically signed.    Sandip Martino M.D.  August 18, 2020

## 2020-08-23 ENCOUNTER — RESULTS ENCOUNTER (OUTPATIENT)
Dept: OBSTETRICS AND GYNECOLOGY | Facility: CLINIC | Age: 60
End: 2020-08-23

## 2020-08-23 DIAGNOSIS — Z12.11 SCREENING FOR COLON CANCER: ICD-10-CM

## 2020-09-30 ENCOUNTER — TRANSCRIBE ORDERS (OUTPATIENT)
Dept: OBSTETRICS AND GYNECOLOGY | Facility: CLINIC | Age: 60
End: 2020-09-30

## 2020-09-30 DIAGNOSIS — Z12.31 VISIT FOR SCREENING MAMMOGRAM: Primary | ICD-10-CM

## 2021-01-05 ENCOUNTER — HOSPITAL ENCOUNTER (OUTPATIENT)
Dept: MAMMOGRAPHY | Facility: HOSPITAL | Age: 61
Discharge: HOME | End: 2021-01-05
Admitting: OBSTETRICS & GYNECOLOGY

## 2021-01-05 DIAGNOSIS — Z12.31 VISIT FOR SCREENING MAMMOGRAM: ICD-10-CM

## 2021-01-05 PROCEDURE — 77067 SCR MAMMO BI INCL CAD: CPT

## 2021-01-05 PROCEDURE — 77067 SCR MAMMO BI INCL CAD: CPT | Performed by: RADIOLOGY

## 2021-01-05 PROCEDURE — 77063 BREAST TOMOSYNTHESIS BI: CPT | Performed by: RADIOLOGY

## 2021-01-05 PROCEDURE — 77063 BREAST TOMOSYNTHESIS BI: CPT

## 2021-07-15 RX ORDER — MEDROXYPROGESTERONE ACETATE 2.5 MG/1
TABLET ORAL
Qty: 90 TABLET | Refills: 0 | Status: SHIPPED | OUTPATIENT
Start: 2021-07-15 | End: 2021-08-23 | Stop reason: SDUPTHER

## 2021-07-15 RX ORDER — ESTRADIOL 0.5 MG/1
TABLET ORAL
Qty: 90 TABLET | Refills: 0 | Status: SHIPPED | OUTPATIENT
Start: 2021-07-15 | End: 2021-08-23 | Stop reason: SDUPTHER

## 2021-08-23 ENCOUNTER — OFFICE VISIT (OUTPATIENT)
Dept: OBSTETRICS AND GYNECOLOGY | Facility: CLINIC | Age: 61
End: 2021-08-23

## 2021-08-23 VITALS
RESPIRATION RATE: 16 BRPM | SYSTOLIC BLOOD PRESSURE: 116 MMHG | BODY MASS INDEX: 22.86 KG/M2 | DIASTOLIC BLOOD PRESSURE: 70 MMHG | WEIGHT: 121 LBS

## 2021-08-23 DIAGNOSIS — Z78.0 POST-MENOPAUSAL: ICD-10-CM

## 2021-08-23 DIAGNOSIS — N95.1 MENOPAUSAL SYMPTOMS: ICD-10-CM

## 2021-08-23 DIAGNOSIS — Z01.419 ENCOUNTER FOR WELL WOMAN EXAM WITH ROUTINE GYNECOLOGICAL EXAM: Primary | ICD-10-CM

## 2021-08-23 PROCEDURE — 99396 PREV VISIT EST AGE 40-64: CPT | Performed by: NURSE PRACTITIONER

## 2021-08-23 RX ORDER — DIPHENOXYLATE HYDROCHLORIDE AND ATROPINE SULFATE 2.5; .025 MG/1; MG/1
2 TABLET ORAL 2 TIMES DAILY
COMMUNITY
Start: 2021-07-25

## 2021-08-23 RX ORDER — ESTRADIOL 0.5 MG/1
0.5 TABLET ORAL DAILY
Qty: 90 TABLET | Refills: 3 | Status: SHIPPED | OUTPATIENT
Start: 2021-08-23 | End: 2022-08-17 | Stop reason: SDUPTHER

## 2021-08-23 RX ORDER — MEDROXYPROGESTERONE ACETATE 2.5 MG/1
2.5 TABLET ORAL DAILY
Qty: 90 TABLET | Refills: 3 | Status: SHIPPED | OUTPATIENT
Start: 2021-08-23 | End: 2022-08-17 | Stop reason: SDUPTHER

## 2021-08-23 NOTE — PROGRESS NOTES
Annual Visit     Patient Name: Karli Jeffery  : 1960   MRN: [id number]   Care Team: Patient Care Team:  Salazar Verduzco MD as PCP - General (Family Medicine)    Chief Complaint:    Chief Complaint   Patient presents with   • Annual Exam       HPI: Karli Jeffery is a 60 y.o. year old  presenting to be seen for her gynecologic exam.   Doing well with HRT Estrace 0.5mg qd and Provera 2.5mg qd - needs refill   Still with some occasional night sweats     Pap smear  WNL and HPV negative     Cologuard 2020 negative     Mammogram 2021 birads 1     DEXA > 10 yrs ago - WNL per pt     Smoker     Subjective      /70   Resp 16   Wt 54.9 kg (121 lb)   Breastfeeding No   BMI 22.86 kg/m²     BMI reviewed: Body mass index is 22.86 kg/m².      Objective     Physical Exam    Neuro: alert and oriented to person, place and time   General:  alert; cooperative; well developed; well nourished   Skin:  No suspicious lesions seen   Thyroid: normal to inspection and palpation   Lungs:  breathing is unlabored  clear to auscultation bilaterally   Heart:  regular rate and rhythm, S1, S2 normal, no murmur, click, rub or gallop  normal apical impulse   Breasts:  Examined in supine position  Symmetric without masses or skin dimpling  Nipples normal without inversion, lesions or discharge  There are no palpable axillary nodes   Abdomen: soft, non-tender; no masses  no umbilical or inguinal hernias are present  no hepato-splenomegaly   Pelvis: Clinical staff was present for exam  External genitalia:  normal appearance of the external genitalia including Bartholin's and [place]'s glands.  :  urethral meatus normal;  Vaginal:  normal pink mucosa without prolapse or lesions.  Cervix:  normal appearance.  Uterus:  normal size, shape and consistency.  Adnexa:  normal bimanual exam of the adnexa.  Rectal:  digital rectal exam not performed; anus visually normal appearing.         Assessment / Plan       Assessment  Problems Addressed This Visit    ICD-10-CM ICD-9-CM   1. Encounter for well woman exam with routine gynecological exam  Z01.419 V72.31   2. Menopausal symptoms  N95.1 627.2   3. Post-menopausal  Z78.0 V49.81       Plan    Pap smear pending   Discussed monthly SBEs and importance of annual imaging     In depth discussion of risks vs benefits of HRT - pt v/u   We will cont considering she still has bothersome menopausal sx but with goal of stopping at least by age 64   No absolute C/Is to HRT     DEXA ordered  Discussed calcium, 600 mg/ Vit. D, 400 IU daily; regular weight-bearing exercise    AV 1 yr             Follow Up  Return in about 1 year (around 8/23/2022) for Annual physical.  Patient was given instructions and counseling regarding her condition or for health maintenance advice. Please see specific information pulled into the AVS if appropriate.     Suzanne Cruz, APRN  August 23, 2021  11:33 EDT

## 2021-12-16 ENCOUNTER — TRANSCRIBE ORDERS (OUTPATIENT)
Dept: ADMINISTRATIVE | Facility: HOSPITAL | Age: 61
End: 2021-12-16

## 2021-12-16 DIAGNOSIS — Z12.31 VISIT FOR SCREENING MAMMOGRAM: Primary | ICD-10-CM

## 2022-01-27 ENCOUNTER — APPOINTMENT (OUTPATIENT)
Dept: MAMMOGRAPHY | Facility: HOSPITAL | Age: 62
End: 2022-01-27

## 2022-02-21 ENCOUNTER — HOSPITAL ENCOUNTER (OUTPATIENT)
Dept: MAMMOGRAPHY | Facility: HOSPITAL | Age: 62
Discharge: HOME | End: 2022-02-21
Admitting: NURSE PRACTITIONER

## 2022-02-21 DIAGNOSIS — Z12.31 VISIT FOR SCREENING MAMMOGRAM: ICD-10-CM

## 2022-02-21 PROCEDURE — 77067 SCR MAMMO BI INCL CAD: CPT | Performed by: RADIOLOGY

## 2022-02-21 PROCEDURE — 77063 BREAST TOMOSYNTHESIS BI: CPT

## 2022-02-21 PROCEDURE — 77063 BREAST TOMOSYNTHESIS BI: CPT | Performed by: RADIOLOGY

## 2022-02-21 PROCEDURE — 77067 SCR MAMMO BI INCL CAD: CPT

## 2022-08-17 ENCOUNTER — TELEPHONE (OUTPATIENT)
Dept: OBSTETRICS AND GYNECOLOGY | Facility: CLINIC | Age: 62
End: 2022-08-17

## 2022-08-17 RX ORDER — ESTRADIOL 0.5 MG/1
0.5 TABLET ORAL DAILY
Qty: 90 TABLET | Refills: 3 | Status: SHIPPED | OUTPATIENT
Start: 2022-08-17 | End: 2022-09-12 | Stop reason: SDUPTHER

## 2022-08-17 RX ORDER — MEDROXYPROGESTERONE ACETATE 2.5 MG/1
2.5 TABLET ORAL DAILY
Qty: 90 TABLET | Refills: 3 | Status: SHIPPED | OUTPATIENT
Start: 2022-08-17 | End: 2022-09-12 | Stop reason: SDUPTHER

## 2022-08-17 NOTE — TELEPHONE ENCOUNTER
Caller: Karli Jeffery    Relationship: Self    Best call back number: [phone]  K TO CALL ANYTIME/M      Pharmacy where request should be sent:    [facility]    Additional details provided by patient:     PTS ENTIRE FAMILY HAS COVID-SHE HAS NOT TESTED POS BUT IS QUARANTINING HERSELF    SHE RESCHEDULED HER APPT TO 9/12/22    PT IS GOING TO RUN OUT OF MEDICATION AND IS ASKING THAT IT BE CALLED IN    estradiol (ESTRACE) 0.5 MG tablet [94619]    medroxyPROGESTERone (PROVERA) 2.5 MG tablet [[id number]]      Does the patient have less than a 3 day supply:  [] Yes  [x] No    Terrie CALIX Rep   08/17/22 11:41 EDT

## 2022-08-17 NOTE — TELEPHONE ENCOUNTER
Please let her know that I sent in refills for the year for her of the estradiol and progesterone.     Thanks!

## 2022-09-12 ENCOUNTER — OFFICE VISIT (OUTPATIENT)
Dept: OBSTETRICS AND GYNECOLOGY | Facility: CLINIC | Age: 62
End: 2022-09-12

## 2022-09-12 VITALS
WEIGHT: 127 LBS | RESPIRATION RATE: 16 BRPM | SYSTOLIC BLOOD PRESSURE: 124 MMHG | DIASTOLIC BLOOD PRESSURE: 70 MMHG | BODY MASS INDEX: 24 KG/M2

## 2022-09-12 DIAGNOSIS — Z01.419 ENCOUNTER FOR GYNECOLOGICAL EXAMINATION WITHOUT ABNORMAL FINDING: Primary | ICD-10-CM

## 2022-09-12 DIAGNOSIS — N95.1 MENOPAUSAL SYMPTOMS: ICD-10-CM

## 2022-09-12 DIAGNOSIS — Z79.890 HORMONE REPLACEMENT THERAPY: ICD-10-CM

## 2022-09-12 PROCEDURE — 99396 PREV VISIT EST AGE 40-64: CPT | Performed by: NURSE PRACTITIONER

## 2022-09-12 RX ORDER — MEDROXYPROGESTERONE ACETATE 2.5 MG/1
2.5 TABLET ORAL DAILY
Qty: 90 TABLET | Refills: 3 | Status: SHIPPED | OUTPATIENT
Start: 2022-09-12

## 2022-09-12 RX ORDER — ESTRADIOL 0.5 MG/1
0.5 TABLET ORAL DAILY
Qty: 90 TABLET | Refills: 3 | Status: SHIPPED | OUTPATIENT
Start: 2022-09-12

## 2022-09-12 NOTE — PROGRESS NOTES
Annual Visit     Patient Name: Karli Jeffery  : 1960   MRN: [id number]   Care Team: Patient Care Team:  Salazar Verduzco MD as PCP - General (Family Medicine)  Suzanne Cruz APRN as Nurse Practitioner (Nurse Practitioner)    Chief Complaint:    Chief Complaint   Patient presents with   • Annual Exam       HPI: Karli Jeffery is a 62 y.o. year old  presenting to be seen for her gynecologic exam.   Doing well with HRT Estrace 0.5mg qd and Provera 2.5mg qd - needs refill   Still with some occasional night sweats   No vaginal bldg in the last yr      Pap smear 2021 WNL and HPV negative      Cologuard 2020 negative      Mammogram 2022 birads 1      DEXA > 10 yrs ago - WNL per pt   Ordered last yr but she didn't have it done   Would like to wait until next yr when her health savings card starts over   Her  had to have surgery unexpectedly and they are still paying off some bills      Smoker     She retired last yr from [employer]   Helps take care of her 6 grandchildren now       Subjective      /70   Resp 16   Wt 57.6 kg (127 lb)   Breastfeeding No   BMI 24.00 kg/m²     BMI reviewed: Body mass index is 24 kg/m².      Objective     Physical Exam    Neuro: alert and oriented to person, place and time   General:  alert; cooperative; well developed; well nourished   Skin:  No suspicious lesions seen   Thyroid: normal to inspection and palpation   Lungs:  breathing is unlabored  clear to auscultation bilaterally   Heart:  regular rate and rhythm, S1, S2 normal, no murmur, click, rub or gallop  normal apical impulse   Breasts:  Examined in supine position  Symmetric without masses or skin dimpling  Nipples normal without inversion, lesions or discharge  There are no palpable axillary nodes   Abdomen: soft, non-tender; no masses  no umbilical or inguinal hernias are present  no hepato-splenomegaly   Pelvis: Clinical staff was present for exam  External genitalia:  normal  appearance of the external genitalia including Bartholin's and Blodgett's glands.  :  urethral meatus normal;  Vaginal:  normal pink mucosa without prolapse or lesions.  Cervix:  normal appearance.  Uterus:  normal size, shape and consistency.  Adnexa:  normal bimanual exam of the adnexa.  Rectal:  digital rectal exam not performed; anus visually normal appearing.         Assessment / Plan      Assessment  Problems Addressed This Visit    ICD-10-CM ICD-9-CM   1. Encounter for gynecological examination without abnormal finding  Z01.419 V72.31   2. Hormone replacement therapy  Z79.890 V07.4   3. Menopausal symptoms  N95.1 627.2       Plan    Pap smear not indicated   Discussed monthly SBEs and importance of annual imaging      In depth discussion of risks vs benefits of HRT - pt v/u   Will cont considering she still has bothersome menopausal sx but with goal of stopping at least by age 64   No absolute C/Is to HRT      Discussed calcium, 600 mg/ Vit. D, 400 IU daily; regular weight-bearing exercise  Will order DEXA next yr      AV 1 yr       40 to 64: Counseling/Anticipatory Guidance Discussed: injury prevention, screenings and self-breast exam    Follow Up  Return in about 1 year (around 9/12/2023) for Annual physical.  Patient was given instructions and counseling regarding her condition or for health maintenance advice. Please see specific information pulled into the AVS if appropriate.     Suzanne Cruz, BEN  September 12, 2022  15:12 EDT

## 2023-02-03 ENCOUNTER — TRANSCRIBE ORDERS (OUTPATIENT)
Dept: ADMINISTRATIVE | Facility: HOSPITAL | Age: 63
End: 2023-02-03
Payer: COMMERCIAL

## 2023-02-03 DIAGNOSIS — Z12.31 VISIT FOR SCREENING MAMMOGRAM: Primary | ICD-10-CM

## 2023-04-11 ENCOUNTER — HOSPITAL ENCOUNTER (OUTPATIENT)
Dept: MAMMOGRAPHY | Facility: HOSPITAL | Age: 63
Discharge: HOME | End: 2023-04-11
Admitting: NURSE PRACTITIONER
Payer: COMMERCIAL

## 2023-04-11 DIAGNOSIS — Z12.31 VISIT FOR SCREENING MAMMOGRAM: ICD-10-CM

## 2023-04-11 PROCEDURE — 77063 BREAST TOMOSYNTHESIS BI: CPT

## 2023-04-11 PROCEDURE — 77063 BREAST TOMOSYNTHESIS BI: CPT | Performed by: RADIOLOGY

## 2023-04-11 PROCEDURE — 77067 SCR MAMMO BI INCL CAD: CPT

## 2023-04-11 PROCEDURE — 77067 SCR MAMMO BI INCL CAD: CPT | Performed by: RADIOLOGY

## 2023-09-20 ENCOUNTER — TELEPHONE (OUTPATIENT)
Dept: OBSTETRICS AND GYNECOLOGY | Facility: CLINIC | Age: 63
End: 2023-09-20
Payer: COMMERCIAL

## 2023-09-20 RX ORDER — ESTRADIOL 0.5 MG/1
0.5 TABLET ORAL DAILY
Qty: 90 TABLET | Refills: 1 | Status: SHIPPED | OUTPATIENT
Start: 2023-09-20

## 2023-09-20 RX ORDER — FLUCONAZOLE 150 MG/1
150 TABLET ORAL DAILY
Qty: 2 TABLET | Refills: 0 | Status: SHIPPED | OUTPATIENT
Start: 2023-09-20

## 2023-09-20 NOTE — TELEPHONE ENCOUNTER
Caller: Karli Jeffery    Relationship: Self    Best call back number: [phone] / LEAVE MESSAGE    Requested Prescriptions: DIFLUCAN    [facility]    PT WAS A PT OF JAVIER BRAN, APRN - PT  WAS TAKING AN ANTIBIOTIC WHICH CAUSED A YEAST INFECTION - PT WANTS TO KNOW IF DIFLUCAN CAN BE CALLED INTO HER PHARMACY - OTC IS NOT WORKING    PLEASE CALL THE PT TO LET HER KNOW    THANK YOU!

## 2023-09-20 NOTE — TELEPHONE ENCOUNTER
Caller: Karli Jeffery    Relationship: Self    Best call back number: [phone] / LEAVE MESSAGE    Requested Prescriptions: estradiol (ESTRACE) 0.5 MG tablet     Pharmacy where request [facility] be sent[facility]    Last office visit with prescribing clinician: Visit date not found   Last telemedicine visit with prescribing clinician: Visit date not found   Next office visit with prescribing clinician: 1/22/2024     Additional details provided by patient: PT WILL RUN OUT BY 10/01/23 AND WILL NEED REFILLS TO HOLD HER UNTIL HER ANNUAL APPT ON 01/22/24 - PT WAS A PT OF BEN HERNANDEZ    Does the patient have less than a 3 day supply:  [] Yes  [x] No    Would you like a call back once the refill request has been completed: [] Yes [x] No    If the office needs to give you a call back, can they leave a voicemail: [x] Yes [] No    Terrie Avelar Rep   09/20/23 10:16 EDT

## 2023-11-16 ENCOUNTER — TELEPHONE (OUTPATIENT)
Dept: OBSTETRICS AND GYNECOLOGY | Facility: CLINIC | Age: 63
End: 2023-11-16
Payer: COMMERCIAL

## 2023-11-16 RX ORDER — ESTRADIOL 0.5 MG/1
0.5 TABLET ORAL DAILY
Qty: 30 TABLET | Refills: 3 | Status: SHIPPED | OUTPATIENT
Start: 2023-11-16

## 2023-11-16 RX ORDER — MEDROXYPROGESTERONE ACETATE 2.5 MG/1
2.5 TABLET ORAL DAILY
Qty: 90 TABLET | Refills: 0 | Status: SHIPPED | OUTPATIENT
Start: 2023-11-16

## 2023-11-16 NOTE — TELEPHONE ENCOUNTER
Rx refill request received for Estradiol Tablet 0.5mg, 30 Qty, Requesting with 3 refills.  [facility]) - in [facility]. Patient last seen by Suzanne Cruz 9/2022, currently scheduled to see Marilee Chadwick January 2024.  Routing to Marilee Farrukh for review.

## 2023-11-16 NOTE — TELEPHONE ENCOUNTER
Caller: Karli Jeffery    Relationship: Self    Best call back number: [phone]    Requested Prescriptions: medroxyPROGESTERone (PROVERA) 2.5 MG tablet   Requested Prescriptions      No prescriptions requested or ordered in this encounter        Pharmacy where request should be sent:  [id number]                                                                     [address], [place]    Last office visit with prescribing clinician: Visit date not found   Last telemedicine visit with prescribing clinician: Visit date not found   Next office visit with prescribing clinician: 1/22/2024     Additional details provided by patient: N/A    Does the patient have less than a 3 day supply:  [x] Yes  [] No    Would you like a call back once the refill request has been completed: [] Yes [x] No    If the office needs to give you a call back, can they leave a voicemail: [] Yes [x] No    Terrie Ji Rep   11/16/23 11:15 EST

## 2024-01-22 ENCOUNTER — OFFICE VISIT (OUTPATIENT)
Dept: OBSTETRICS AND GYNECOLOGY | Facility: CLINIC | Age: 64
End: 2024-01-22
Payer: COMMERCIAL

## 2024-01-22 VITALS
DIASTOLIC BLOOD PRESSURE: 80 MMHG | SYSTOLIC BLOOD PRESSURE: 160 MMHG | WEIGHT: 128.2 LBS | HEIGHT: 61 IN | BODY MASS INDEX: 24.2 KG/M2

## 2024-01-22 DIAGNOSIS — R06.2 WHEEZING: ICD-10-CM

## 2024-01-22 DIAGNOSIS — Z01.411 ENCOUNTER FOR GYNECOLOGICAL EXAMINATION WITH ABNORMAL FINDING: Primary | ICD-10-CM

## 2024-01-22 DIAGNOSIS — Z12.11 SCREENING FOR COLON CANCER: ICD-10-CM

## 2024-01-22 DIAGNOSIS — Z13.820 SCREENING FOR OSTEOPOROSIS: ICD-10-CM

## 2024-01-22 DIAGNOSIS — Z78.0 POSTMENOPAUSAL: ICD-10-CM

## 2024-01-22 RX ORDER — MONTELUKAST SODIUM 10 MG/1
10 TABLET ORAL DAILY
COMMUNITY

## 2024-01-24 LAB — REF LAB TEST METHOD: NORMAL

## 2024-02-05 NOTE — PROGRESS NOTES
Chief Complaint  Karli Jeffery is a 63 y.o.  female presenting for Annual Exam and Med Refill (Estradiol 0.5 mg (#90 with refills), provera 2.5 mg (#90 with refills).  [facility] pharmacy.  )    History of Present Illness  Karli is a 62yo woman, , here for annual gyn exam.  Her past surgical history includes, in part, an endometrial ablation and an anal sphincteroplasty.    She is using oral HRT, and it relieves her menopausal symptoms.    She does verbalize good understanding of the risks & benefits of HRT.  And wishes to come off HRT after current dose.  She is a smoker ~ .25 ppd.    She has asthma.  She has a rescue inhaler.    HTN, usually well controlled.  She also has IBS.  Last Cologuard 2020, negative.    Last pap 2021, wnl, neg HPV  Last mammogram 23, neg  Hasn't had a baseline DEXA scan yet, but willing to do so.    The following portions of the patient's history were reviewed and updated as appropriate: allergies, current medications, past family history, past medical history, past social history, past surgical history, and problem list.    Allergies   Allergen Reactions    Cefdinir Itching    Codeine Nausea And Vomiting         Current Outpatient Medications:     albuterol sulfate  (90 Base) MCG/ACT inhaler, Every 4 (Four) Hours., Disp: , Rfl:     amLODIPine (NORVASC) 5 MG tablet, amlodipine 5 mg tablet  TAKE 1 TABLET BY MOUTH DAILY, Disp: , Rfl:     citalopram (CeleXA) 40 MG tablet, citalopram 40 mg tablet  TAKE 1 TABLET BY MOUTH DAILY AT BEDTIME, Disp: , Rfl:     clonazePAM (KlonoPIN) 0.5 MG tablet, Every 8 (Eight) Hours., Disp: , Rfl:     CloNIDine (CATAPRES) 0.3 MG tablet, clonidine HCl 0.3 mg tablet  TAKE 1 TABLET BY MOUTH TWICE DAILY, Disp: , Rfl:     diphenoxylate-atropine (LOMOTIL) 2.5-0.025 MG per tablet, Take 2 tablets by mouth 2 (Two) Times a Day., Disp: , Rfl:     estradiol (ESTRACE) 0.5 MG tablet, Take 1 tablet by mouth Daily., Disp: 30 tablet, Rfl: 3     "fosinopril (MONOPRIL) 40 MG tablet, fosinopril 40 mg tablet  TAKE ONE TABLET BY MOUTH TWO TIMES A DAY, Disp: , Rfl:     medroxyPROGESTERone (PROVERA) 2.5 MG tablet, Take 1 tablet by mouth Daily., Disp: 90 tablet, Rfl: 0    methocarbamol (ROBAXIN) 750 MG tablet, methocarbamol 750 mg tablet  TAKE 1 TABLET BY MOUTH 3 TIMES A DAY AS NEEDED, Disp: , Rfl:     montelukast (SINGULAIR) 10 MG tablet, Take 1 tablet by mouth Daily., Disp: , Rfl:     nadolol (CORGARD) 20 MG tablet, nadolol 20 mg tablet  TAKE 1 TABLET BY MOUTH EVERY MORNING TAKE 2 TABLETS BY MOUTH EVERY EVENING, Disp: , Rfl:     potassium chloride (MICRO-K) 10 MEQ CR capsule, potassium chloride ER 10 mEq capsule,extended release  TAKE 2 CAPSULES BY MOUTH AT BEDTIME, Disp: , Rfl:     pravastatin (PRAVACHOL) 40 MG tablet, pravastatin 40 mg tablet  TAKE 1 TABLET BY MOUTH AT BEDTIME, Disp: , Rfl:     Past Medical History:   Diagnosis Date    Asthma     Colon cancer screening 09/2020    Negative Cologuard    Frequent headaches     Hypertension     IBS (irritable bowel syndrome)     as a child    Night sweats     Traumatic extension of episiotomy 1981 - repaired '88 03/15/2017    UTI (urinary tract infection)     Weight loss         Past Surgical History:   Procedure Laterality Date    ANAL SPHIN[facility]EROPLASTY  1988    [facility]    BACK SURGERY  2005    bone spur/cyst / sciatic pain    COLONOSCOPY  1987    ENDOMETRIAL ABLATION  2001       Objective  /80   Ht 154.9 cm (61\")   Wt 58.2 kg (128 lb 3.2 oz)   Breastfeeding No   BMI 24.22 kg/m²     Physical Exam  Vitals and nursing note reviewed. Exam conducted with a chaperone present.   Constitutional:       General: She is not in acute distress.     Appearance: Normal appearance. She is not ill-appearing.   HENT:      Head: Normocephalic.   Neck:      Thyroid: No thyroid mass or thyromegaly.   Cardiovascular:      Rate and Rhythm: Normal rate and regular rhythm.      Heart sounds: Normal heart sounds. No murmur " heard.  Pulmonary:      Effort: Pulmonary effort is normal. No respiratory distress.      Comments: Wheezing bilat  Chest:   Breasts:     Right: No inverted nipple, mass or nipple discharge.      Left: No inverted nipple, mass or nipple discharge.   Abdominal:      Palpations: Abdomen is soft. There is no mass.      Tenderness: There is no abdominal tenderness.   Genitourinary:     General: Normal vulva.      Labia:         Right: No rash, tenderness or lesion.         Left: No rash, tenderness or lesion.       Vagina: Normal. No erythema.      Cervix: No discharge, lesion or erythema.      Uterus: Not enlarged and not tender.       Adnexa:         Right: No mass or tenderness.          Left: No mass or tenderness.        Comments: Anus appears wnl.  No rectal exam performed.  Lymphadenopathy:      Upper Body:      Right upper body: No supraclavicular or axillary adenopathy.      Left upper body: No supraclavicular or axillary adenopathy.   Skin:     General: Skin is warm and dry.   Neurological:      Mental Status: She is alert and oriented to person, place, and time.   Psychiatric:         Mood and Affect: Mood normal.         Behavior: Behavior normal.         Assessment/Plan   Diagnoses and all orders for this visit:    1. Encounter for gynecological examination with abnormal finding (Primary)  -     LIQUID-BASED PAP SMEAR WITH HPV GENOTYPING REGARDLESS OF INTERPRETATION (MAKEDA,COR,MAD)    2. Wheezing    3. Postmenopausal  -     DEXA Bone Density Axial; Future    4. Screening for osteoporosis  -     DEXA Bone Density Axial; Future    5. Screening for colon cancer  -     Cologuard - Stool, Per Rectum; Future    She will call & try to get in with PCP soon re: asthma.  To ER prn any severe symptoms/ flare / or asthma attacks.        Procedures    40 to 64: Counseling/Anticipatory Guidance Discussed: nutrition, physical activity, screenings, self-breast exam, and HRT risks / benefits.    Return in about 1 year (around  1/22/2025) for Annual physical, Phone # for setting up mammo; call & see if Dr. Verduzco,[place]  [phone].    Khadra Chadwick, APRN  01/22/2024

## 2024-02-22 ENCOUNTER — TRANSCRIBE ORDERS (OUTPATIENT)
Dept: OBSTETRICS AND GYNECOLOGY | Facility: CLINIC | Age: 64
End: 2024-02-22
Payer: COMMERCIAL

## 2024-02-22 ENCOUNTER — TELEPHONE (OUTPATIENT)
Dept: OBSTETRICS AND GYNECOLOGY | Facility: CLINIC | Age: 64
End: 2024-02-22
Payer: COMMERCIAL

## 2024-02-22 DIAGNOSIS — Z12.31 VISIT FOR SCREENING MAMMOGRAM: Primary | ICD-10-CM

## 2024-02-22 RX ORDER — MEDROXYPROGESTERONE ACETATE 2.5 MG/1
2.5 TABLET ORAL DAILY
Qty: 90 TABLET | Refills: 3 | Status: SHIPPED | OUTPATIENT
Start: 2024-02-22 | End: 2024-02-22 | Stop reason: SDUPTHER

## 2024-02-22 RX ORDER — MEDROXYPROGESTERONE ACETATE 2.5 MG/1
2.5 TABLET ORAL DAILY
Qty: 90 TABLET | Refills: 3 | Status: SHIPPED | OUTPATIENT
Start: 2024-02-22

## 2024-02-22 NOTE — TELEPHONE ENCOUNTER
Caller: Karli Jeffery    Relationship: Self    Best call back number: [phone] CALL ANYTIME, IT IS OKAY TO LVM ON HOME NUMBER.    Requested Prescriptions:   Requested Prescriptions      No prescriptions requested or ordered in this encounter      medroxyPROGESTERone (PROVERA) 2.5 MG tablet [4855]    Pharmacy where request should be sent: [facility] #[id number] - [address] [phone]  - [phone]      Last office visit with prescribing clinician: 1/22/2024   Last telemedicine visit with prescribing clinician: Visit date not found   Next office visit with prescribing clinician: 2/5/2025     Additional details provided by patient: PATIENT IS REQUESTING REFILLS ON medroxyPROGESTERone (PROVERA) 2.5 MG tablet [4855]. PHARMACY WAS ABLE TO FILL estradiol (ESTRACE) 0.5 MG tablet [28108]. ZERO DAY SUPPLY OF PROVERA.     Does the patient have less than a 3 day supply:  [x] Yes  [] No    Would you like a call back once the refill request has been completed: [] Yes [x] No    If the office needs to give you a call back, can they leave a voicemail: [x] Yes [] No    Terrie Dukes   02/22/24 12:03 EST

## 2024-02-22 NOTE — TELEPHONE ENCOUNTER
Rx Refill Note  prescription refill request from the pharmacy.  Next appointment with Marilee 2/5/24.  Requested Prescriptions     Pending Prescriptions Disp Refills    medroxyPROGESTERone (Provera) 2.5 MG tablet 30 tablet 12     Sig: Take 1 tablet by mouth Daily.      Last office visit with prescribing clinician: 1/22/2024   Last telemedicine visit with prescribing clinician: Visit date not found   Next office visit with prescribing clinician: 2/5/2025                         Would you like a call back once the refill request has been completed: [] Yes [] No    If the office needs to give you a call back, can they leave a voicemail: [] Yes [] No    Maryuri Kong MA  02/22/24, 13:48 EST

## 2024-02-29 DIAGNOSIS — Z12.11 SCREENING FOR COLON CANCER: Primary | ICD-10-CM

## 2024-04-02 RX ORDER — SODIUM, POTASSIUM,MAG SULFATES 17.5-3.13G
SOLUTION, RECONSTITUTED, ORAL ORAL
Qty: 354 ML | Refills: 0 | Status: SHIPPED | OUTPATIENT
Start: 2024-04-02

## 2024-04-15 ENCOUNTER — HOSPITAL ENCOUNTER (OUTPATIENT)
Dept: MAMMOGRAPHY | Facility: HOSPITAL | Age: 64
Discharge: HOME | End: 2024-04-15
Admitting: NURSE PRACTITIONER
Payer: COMMERCIAL

## 2024-04-15 DIAGNOSIS — Z12.31 VISIT FOR SCREENING MAMMOGRAM: ICD-10-CM

## 2024-04-15 PROCEDURE — 77067 SCR MAMMO BI INCL CAD: CPT

## 2024-04-15 PROCEDURE — 77063 BREAST TOMOSYNTHESIS BI: CPT

## 2024-04-15 RX ORDER — ESTRADIOL 0.5 MG/1
0.5 TABLET ORAL DAILY
Qty: 90 TABLET | Refills: 3 | Status: SHIPPED | OUTPATIENT
Start: 2024-04-15

## 2024-04-15 NOTE — TELEPHONE ENCOUNTER
Rx Refill Note  Fax refill request received from patient's pharmacy.  Next appointment with Marilee 2/5/25.  Requested Prescriptions      No prescriptions requested or ordered in this encounter      Last office visit with prescribing clinician: 1/22/2024   Last telemedicine visit with prescribing clinician: Visit date not found   Next office visit with prescribing clinician: 2/5/2025                         Would you like a call back once the refill request has been completed: [] Yes [] No    If the office needs to give you a call back, can they leave a voicemail: [] Yes [] No    Maryuri Kong MA  04/15/24, 15:05 EDT

## 2024-04-17 ENCOUNTER — OUTSIDE FACILITY SERVICE (OUTPATIENT)
Dept: GASTROENTEROLOGY | Facility: CLINIC | Age: 64
End: 2024-04-17
Payer: COMMERCIAL

## 2024-04-17 PROCEDURE — 45381 COLONOSCOPY SUBMUCOUS NJX: CPT | Performed by: INTERNAL MEDICINE

## 2024-04-17 PROCEDURE — 45385 COLONOSCOPY W/LESION REMOVAL: CPT | Performed by: INTERNAL MEDICINE

## 2024-04-17 PROCEDURE — 88305 TISSUE EXAM BY PATHOLOGIST: CPT | Performed by: INTERNAL MEDICINE

## 2024-04-18 ENCOUNTER — LAB REQUISITION (OUTPATIENT)
Dept: LAB | Facility: HOSPITAL | Age: 64
End: 2024-04-18
Payer: COMMERCIAL

## 2024-04-18 DIAGNOSIS — Z12.11 ENCOUNTER FOR SCREENING FOR MALIGNANT NEOPLASM OF COLON: ICD-10-CM

## 2024-04-18 DIAGNOSIS — D12.3 BENIGN NEOPLASM OF TRANSVERSE COLON: ICD-10-CM

## 2024-04-18 DIAGNOSIS — D12.5 BENIGN NEOPLASM OF SIGMOID COLON: ICD-10-CM

## 2024-04-19 LAB — REF LAB TEST METHOD: NORMAL

## 2024-12-23 RX ORDER — SODIUM, POTASSIUM,MAG SULFATES 17.5-3.13G
SOLUTION, RECONSTITUTED, ORAL ORAL
Qty: 354 ML | Refills: 0 | OUTPATIENT
Start: 2024-12-23

## 2025-02-24 ENCOUNTER — TELEPHONE (OUTPATIENT)
Dept: OBSTETRICS AND GYNECOLOGY | Facility: CLINIC | Age: 65
End: 2025-02-24
Payer: COMMERCIAL

## 2025-02-24 RX ORDER — MEDROXYPROGESTERONE ACETATE 2.5 MG/1
2.5 TABLET ORAL DAILY
Qty: 90 TABLET | Refills: 3 | Status: SHIPPED | OUTPATIENT
Start: 2025-02-24

## 2025-02-24 NOTE — TELEPHONE ENCOUNTER
----- Message from Lucia Reid sent at 2/24/2025 12:52 PM EST -----        ----- Message -----  From: Cady Horowitz RegSched Rep  Sent: 2/24/2025   9:46 AM EST  To: Lucia Reid MD

## 2025-02-24 NOTE — TELEPHONE ENCOUNTER
Message  Received: Today  Lucia Reid MD  P e [facility] G[facility] Clinical Pool         Previous Messages    Routed Note    Author: Lucia Reid MD Service: -- Author Type: Physician   Filed: 02/24/25 1252 Encounter Date: 2/24/2025 Status: Signed   : Lucia Reid MD (Physician)     Requested refill sent to listed pharmacy.  Lucia Reid MD              File Link    Scan on 2/24/2025 0109 by [employer], [place]: [facility]/REQUEST FOR A REFILL/02/24/2025        Key Information    Document ID File Type Document Type Description   [id number] Image MEDICATIONS - SCAN [facility]/REQUEST FOR A REFILL/02/24/2025     Import Information    Attached At Date Time User Dept   Encounter Level 2/24/2025  1:09 AM [employer], [place]      Encounter    Scanned Document on 2/24/25 with [employer], [place]      Pt aware that Rx has been sent.

## 2025-02-27 ENCOUNTER — TELEPHONE (OUTPATIENT)
Dept: OBSTETRICS AND GYNECOLOGY | Facility: CLINIC | Age: 65
End: 2025-02-27
Payer: COMMERCIAL

## 2025-02-27 RX ORDER — ESTRADIOL 0.5 MG/1
0.5 TABLET ORAL DAILY
Qty: 90 TABLET | Refills: 1 | Status: SHIPPED | OUTPATIENT
Start: 2025-02-27

## 2025-02-27 NOTE — TELEPHONE ENCOUNTER
Requested prescription sent to listed pharmacy with enough refills to cover her until next appointment. Thanks,  Lucia Reid MD

## 2025-02-28 ENCOUNTER — TELEPHONE (OUTPATIENT)
Dept: OBSTETRICS AND GYNECOLOGY | Facility: CLINIC | Age: 65
End: 2025-02-28
Payer: COMMERCIAL

## 2025-02-28 ENCOUNTER — TRANSCRIBE ORDERS (OUTPATIENT)
Dept: ADMINISTRATIVE | Facility: HOSPITAL | Age: 65
End: 2025-02-28
Payer: COMMERCIAL

## 2025-02-28 DIAGNOSIS — Z12.31 SCREENING MAMMOGRAM FOR BREAST CANCER: Primary | ICD-10-CM

## 2025-02-28 RX ORDER — ESTRADIOL 0.5 MG/1
0.5 TABLET ORAL DAILY
Qty: 90 TABLET | Refills: 1 | Status: CANCELLED | OUTPATIENT
Start: 2025-02-28

## 2025-02-28 NOTE — TELEPHONE ENCOUNTER
Caller: Karli Jeffery    Relationship: Self    Best call back number: [phone]    Requested Prescriptions:     estradiol (ESTRACE) 0.5 MG tablet     Requested Prescriptions      No prescriptions requested or ordered in this encounter        [facility] [place]    Last office visit with prescribing clinician: Visit date not found   Last telemedicine visit with prescribing clinician: Visit date not found   Next office visit with prescribing clinician: 8/13/2025     Additional details provided by patient: PT REQUESTING TO GET A REFILL OF THIS MEDICATION - SHE IS SCHEDULED WITH DR. LIMON IN AUGUST - PRIOR PT OF MAYRA VANN AND JAVIER CAREY    Does the patient have less than a 3 day supply:  [x] Yes  [] No    Would you like a call back once the refill request has been completed: [] Yes [x] No    If the office needs to give you a call back, can they leave a voicemail: [] Yes [x] No    Terrie Landa Rep   02/28/25 10:09 EST

## 2025-04-17 ENCOUNTER — HOSPITAL ENCOUNTER (OUTPATIENT)
Dept: MAMMOGRAPHY | Facility: HOSPITAL | Age: 65
Discharge: HOME | End: 2025-04-17
Admitting: STUDENT IN AN ORGANIZED HEALTH CARE EDUCATION/TRAINING PROGRAM
Payer: COMMERCIAL

## 2025-04-17 DIAGNOSIS — Z12.31 SCREENING MAMMOGRAM FOR BREAST CANCER: ICD-10-CM

## 2025-04-17 PROCEDURE — 77067 SCR MAMMO BI INCL CAD: CPT

## 2025-04-17 PROCEDURE — 77063 BREAST TOMOSYNTHESIS BI: CPT

## 2025-06-10 RX ORDER — ESTRADIOL 0.5 MG/1
0.5 TABLET ORAL DAILY
Qty: 90 TABLET | Refills: 1 | OUTPATIENT
Start: 2025-06-10

## 2025-06-10 NOTE — TELEPHONE ENCOUNTER
Pt informed that she has refills available.     Message sent to front office for rescheduling, as pt is on vacation at that time. Please call to reschedule with any provider.

## 2025-06-18 ENCOUNTER — OFFICE VISIT (OUTPATIENT)
Dept: OBSTETRICS AND GYNECOLOGY | Facility: CLINIC | Age: 65
End: 2025-06-18
Payer: COMMERCIAL

## 2025-06-18 VITALS
DIASTOLIC BLOOD PRESSURE: 84 MMHG | HEIGHT: 61 IN | WEIGHT: 118.2 LBS | SYSTOLIC BLOOD PRESSURE: 138 MMHG | BODY MASS INDEX: 22.31 KG/M2

## 2025-06-18 DIAGNOSIS — Z01.419 WELL WOMAN EXAM WITH ROUTINE GYNECOLOGICAL EXAM: Primary | ICD-10-CM

## 2025-06-18 DIAGNOSIS — N39.3 SUI (STRESS URINARY INCONTINENCE, FEMALE): ICD-10-CM

## 2025-06-18 RX ORDER — BUPROPION HYDROCHLORIDE 150 MG/1
1 TABLET ORAL DAILY
COMMUNITY
Start: 2024-12-26

## 2025-06-18 RX ORDER — ASPIRIN 81 MG/1
81 TABLET ORAL DAILY
COMMUNITY

## 2025-06-18 RX ORDER — ALBUTEROL SULFATE AND BUDESONIDE 90; 80 UG/1; UG/1
AEROSOL, METERED RESPIRATORY (INHALATION)
COMMUNITY
Start: 2025-06-10

## 2025-06-18 NOTE — PROGRESS NOTES
Subjective   Chief Complaint   Patient presents with    Annual Exam     Patient on HRT.  Does not need prescriptions at this time.      Urinary Incontinence     Karli Jeffery is a 64 y.o. year old  menopausal female presenting to be seen for her annual exam.  This past year she has been on hormone replacement therapy. Tried to come off of PO estradiol/provera, but had significant mood changes after stopping abruptly.  There has not been vaginal bleeding in the last 12 months.  Menopausal symptoms are not present. Once a month has some bloating and back ache.     Has HTN - well managed  Smoking - 2 packs per week     Open to trying to wean off of HRT    Screening:   MM2025  Colon cancer: cologuard and colonoscopy , repeat 3 years   DEXA: not done  Pap: 2024 NILM/HPV (-) no h/o abnormal     SEXUAL Hx:  She is currently sexually active.  In the past year there there has been NO new sexual partners.    Condoms are never used.  She would not like to be screened for STD's at today's exam.  [place] is painful: no  HEALTH Hx:  She exercises regularly: yes.  She wears her seat belt: yes.  She has concerns about domestic violence: no.  She has noticed changes in height: yes.  OTHER THINGS SHE WANTS TO DISCUSS TODAY:  SCOTTIE    The following portions of the patient's history were reviewed and updated as appropriate:problem list, current medications, allergies, past family history, past medical history, past social history, and past surgical history.    Social History    Tobacco Use      Smoking status: Every Day        Packs/day: 0.25        Types: Cigarettes      Smokeless tobacco: Never      Review of Systems   Constitutional: Negative.  Negative for unexpected weight change.   Respiratory: Negative.  Negative for chest tightness and shortness of breath.    Cardiovascular: Negative.  Negative for chest pain.   Gastrointestinal:  Positive for abdominal distention (intermittent). Negative for  "constipation and diarrhea.   Endocrine: Positive for heat intolerance (intermittent night sweats).   Genitourinary:  Negative for difficulty urinating, dyspareunia, dysuria, frequency, genital sores, menstrual problem, pelvic pain, urgency, vaginal bleeding, vaginal discharge and vaginal pain.        SCOTTIE    Musculoskeletal:  Positive for back pain (intermittent).   Skin: Negative.         No breast concerns    Psychiatric/Behavioral: Negative.            Objective   /84   Ht 154.9 cm (61\")   Wt 53.6 kg (118 lb 3.2 oz)   Breastfeeding No   BMI 22.33 kg/m²     Physical Exam  Vitals and nursing note reviewed. Exam conducted with a chaperone present.   Constitutional:       Appearance: Normal appearance. She is not ill-appearing.   HENT:      Head: Normocephalic and atraumatic.   Eyes:      General: No scleral icterus.  Neck:      Comments: Normal thyroid   Pulmonary:      Effort: Pulmonary effort is normal. No respiratory distress.   Chest:   Breasts:     Breasts are symmetrical.      Right: Normal.      Left: Normal.   Abdominal:      General: There is no distension.      Palpations: Abdomen is soft. There is no mass.      Tenderness: There is no abdominal tenderness. There is no guarding.      Hernia: No hernia is present.   Genitourinary:     General: Normal vulva.      Exam position: Lithotomy position.      Labia:         Right: No rash, tenderness or lesion.         Left: No rash, tenderness or lesion.       Urethra: No urethral lesion.      Vagina: Normal.      Cervix: Normal.      Uterus: Normal.       Adnexa: Right adnexa normal and left adnexa normal.      Rectum: Normal. No external hemorrhoid.      Comments: Digital rectal exam deferred, appears visually normal    Musculoskeletal:         General: No swelling. Normal range of motion.      Cervical back: Normal range of motion.      Right lower leg: No edema.      Left lower leg: No edema.   Lymphadenopathy:      Upper Body:      Right upper body: " No supraclavicular, axillary or pectoral adenopathy.      Left upper body: No supraclavicular, axillary or pectoral adenopathy.   Skin:     General: Skin is warm and dry.   Neurological:      General: No focal deficit present.      Mental Status: She is alert and oriented to person, place, and time.   Psychiatric:         Mood and Affect: Mood normal.         Behavior: Behavior normal.         Thought Content: Thought content normal.         Judgment: Judgment normal.                 Assessment   Annual well woman exam  Menopausal female currently on HRT - without significant symptoms affecting activities of daily living  Stress urinary incontinence  She is up to date on all relevant gynecologic and colorectal screenings except mammography and DEXA's for osteoporosis     Plan   Normal gynecologic exam, RTC in 1 year for annual visit  Data from the women's health initiative study was reviewed.  With Prempro versus placebo, it was explained that there was no significant difference in the rates of stroke, heart attack or breast cancer until greater than 5 years of use.  The magnitude of risk after 5 years of use was approximately 7 additional cases of breast cancer, heart attack and stroke per 10,000 women years of use.  When the data was reanalyzed including only those women initiating HRT within close proximity of the natural menopause, only the rate of stroke persisted.  Furthermore, data is only available for Prempro.  Any extrapolation to other forms of hormone therapy cannot accurately say whether the risks are equal, less for more than with the medications studied.  Ultimately, if she wishes to use hormone replacement therapy, the goal would be to try to reduce it to the lowest possible dose.  Preferably, the goal would be total withdrawal of systemic hormone therapy by 5 years.  There is no good prospective data to quantify the risk for use of HRT for greater than 6 years. Discussed that her HTN and smoking  combined could increase her risk for VTE.   Patient agreeable to trying to wean off of HRT. Will call if not successful.   Discussed options for stress urinary incontinence, including referral to URO/GYN and pelvic floor PT. Patient will contact clinic if she would like to proceed with either option.   She was encouraged to get yearly mammograms.  She should report any palpable breast lump(s) or skin changes regardless of mammographic findings.  I explained to Karli that notification regarding her mammogram results will come from the center performing the study.  Our office will not be routinely calling with mammogram results.  It is her responsibility to make sure that the results from the mammogram are communicated to her by the breast center.  If she has any questions about the results, she is welcome to call our office anytime.  Bone density testing was recommended.  I reviewed with Karli that it was always most advisable for all bone density tests for each patient to be done on the same machine over time.  The purpose of this is to improve the accuracy of the interpretation of serial studies.  I discussed with Karli that she may be behind on needed vaccinations for Shingles [Shingrix] and Pneumoccal (PCV20).  She may be able to obtain these vaccinations at her local pharmacy OR speak about obtaining them with her primary care.  If she does obtain her vaccines, I have asked Karli to let us know the date each vaccine was obtained so that her medical record could be updated in our system.  The importance of keeping all planned follow-up and taking all medications as prescribed was emphasized.  Follow up for annual exam 1 year    No orders of the defined types were placed in this encounter.           Return if symptoms worsen or fail to improve, for Annual physical.    Sofia Grant, APRN  June 19, 2025

## 2025-08-26 ENCOUNTER — HOSPITAL ENCOUNTER (OUTPATIENT)
Dept: BONE DENSITY | Facility: HOSPITAL | Age: 65
Discharge: HOME | End: 2025-08-26
Payer: COMMERCIAL

## 2025-08-26 DIAGNOSIS — Z01.419 WELL WOMAN EXAM WITH ROUTINE GYNECOLOGICAL EXAM: ICD-10-CM

## 2025-08-26 PROCEDURE — 77080 DXA BONE DENSITY AXIAL: CPT
